# Patient Record
Sex: FEMALE | Race: WHITE
[De-identification: names, ages, dates, MRNs, and addresses within clinical notes are randomized per-mention and may not be internally consistent; named-entity substitution may affect disease eponyms.]

---

## 2018-01-04 ENCOUNTER — HOSPITAL ENCOUNTER (INPATIENT)
Dept: HOSPITAL 50 - VM.MS | Age: 83
LOS: 4 days | Discharge: NURSING, REHAB OR LONG-TERM CARE | DRG: 193 | End: 2018-01-08
Attending: FAMILY MEDICINE | Admitting: FAMILY MEDICINE
Payer: MEDICARE

## 2018-01-04 DIAGNOSIS — Z79.82: ICD-10-CM

## 2018-01-04 DIAGNOSIS — R79.1: ICD-10-CM

## 2018-01-04 DIAGNOSIS — G31.84: ICD-10-CM

## 2018-01-04 DIAGNOSIS — Z88.5: ICD-10-CM

## 2018-01-04 DIAGNOSIS — J11.00: Primary | ICD-10-CM

## 2018-01-04 DIAGNOSIS — Z85.3: ICD-10-CM

## 2018-01-04 DIAGNOSIS — I11.0: ICD-10-CM

## 2018-01-04 DIAGNOSIS — M19.90: ICD-10-CM

## 2018-01-04 DIAGNOSIS — I48.91: ICD-10-CM

## 2018-01-04 DIAGNOSIS — K21.9: ICD-10-CM

## 2018-01-04 DIAGNOSIS — Z86.73: ICD-10-CM

## 2018-01-04 DIAGNOSIS — I50.32: ICD-10-CM

## 2018-01-04 DIAGNOSIS — Z91.013: ICD-10-CM

## 2018-01-04 DIAGNOSIS — Z79.899: ICD-10-CM

## 2018-01-04 DIAGNOSIS — Z79.01: ICD-10-CM

## 2018-01-04 DIAGNOSIS — J96.01: ICD-10-CM

## 2018-01-04 DIAGNOSIS — F32.9: ICD-10-CM

## 2018-01-04 DIAGNOSIS — F03.90: ICD-10-CM

## 2018-01-04 DIAGNOSIS — E78.00: ICD-10-CM

## 2018-01-04 DIAGNOSIS — Z88.1: ICD-10-CM

## 2018-01-04 DIAGNOSIS — M81.0: ICD-10-CM

## 2018-01-04 NOTE — PCM.HP
H&P History of Present Illness





- General


Date of Service: 01/04/18


Admit Problem/Dx: 


 Admission Diagnosis/Problem





Admission Diagnosis/Problem      Pneumonia








Source of Information: Patient, Nursing Home Records





- History of Present Illness


Initial Comments - Free Text/Narative: 





HPI Comments:~FEver, dyspnea, cough - past 24 hours at nursing home. ~No chest 

pain. ~Probably exposed to some flu. ~Receiv flu shot. ~Receive Tamiflu 

prophylaxis 1 or two doses.





Fever~~


Associated symptoms include coughing. Pertinent negatives include no chest pain~

or vomiting. 


Other~


Associated symptoms include coughing~and a fever. Pertinent negatives include 

no chest pain~or vomiting. 


Pallor~


Associated symptoms include coughing~and a fever. Pertinent negatives include 

no chest pain~or vomiting. 








Medications











  MedicationsPriortoVisit


 




















  Outpatient Medications Prior to Visit





Medication


Sig


Dispense


Refill








traMADol (ULTRAM) 50 mg tablet


Take 1 tablet (50 mg) by mouth 2 times a day TAKE 1 TABLET BY MOUTH 3 TIMES 

DAILY


60 tablet


5








furosemide (LASIX) 20 mg tablet


Take 2 tablets (40 mg) by mouth 2 times a day


120 tablet


11








ketoconazole (NIZORAL) 2% shampoo


APPLY TO DAMP SCALP, LEAVE ON AS LONG AS POSSIBLE BEFORE SHAMPOOING OUT, USE 

DAILY


120 mL


11








mirtazapine (REMERON) 15 mg tablet


Take 15 mg by mouth 1 time per day














omega-3 fatty acids (FISH OIL) 1000 mg capsule


Take 1,000 mg by mouth 1 time per day














Sennosides-Docusate Sodium (SENNA PLUS PO)


Take by mouth 2 times a day














bisacodyl (DULCOLAX) 10 mg suppository


10 mg














calcium ascorbate 500 MG TABS


500 mg














predniSONE 5 mg tablet


TAKE 1 TABLET BY MOUTH ONE TIME A DAY WITH 1MG TABLET


14 tablet


11








warfarin (COUMADIN) 5 mg tablet


[facility] Anticoagulation Pt:Take as directed. (Insurance Purposes: 2.5-5 

mg daily dose range) Call [phone] if questions.


90 tablet


3








potassium chloride (K-PEEWEE) 20 MEQ packet


Take 1 packet (20 mEq total) by mouth 3 times a day Dissolve in 4 to 8 ounces 

of water or juice. (Patient taking differently: Take 20 mEq by mouth 1 time per 

day Dissolve in 4 to 8 ounces of water or juice. )


90 packet


3








Phytonadione, vitamin K, 100 MCG TABS tablet


Take 1 tablet (100 micrograms) by mouth once daily while on warfarin to 

stabilize labile INRs and dietary vitamin K consumption.


100 tablet


3








aspirin 81 mg chewable tablet


Take 1 tablet (81 mg total) by mouth 1 time per day


30 tablet


1








atorvaSTATin (LIPITOR) 40 mg tablet


Take 1 tablet (40 mg total) by mouth 1 time per day


30 tablet


1








acetaminophen (TYLENOL) 500 mg tablet


Take 1 tablet (500 mg total) by mouth Every 4 hours as needed for mild pain or 

fever > (indicate temp) (Temp >100.0)


30 tablet


1








albuterol HFA (PROAIR HFA) 108 (90 BASE) MCG/ACT inhaler


Inhale 1 puff orally every 4 to 6 hours as needed for shortness of breath Shake 

well before using.


1 Inhaler


0








venlafaxine (EFFEXOR) 75 mg tablet


Take 1 tablet (75 mg total) by mouth 2 times a day


30 tablet


1








ondansetron (ZOFRAN) 4 mg tablet


Take 1 tablet (4 mg total) by mouth every 6 hours as needed for nausea (if 

nausea does not improve in one hour may take a second tablet)


60 tablet


1








carVEDilol (COREG) 6.25 mg tablet


Take 1 tablet (6.25 mg total) by mouth 2 times a day with meals


60 tablet


1








fluocinonide (LIDEX) 0.05 % SOLN


Apply once or twice daily to affected scalp as needed for itching and rash


60 mL


11








pyrithione zinc (SELSUN BLUE DRY SCALP) 1 % SHAM


Shampoo hair with Selsun Blue 2 times a week for 2 week, then once weekly 

thereafter.


118 mL


0








melatonin 3 mg tablet


Take 1 tablet (3 mg total) by mouth every night at bedtime


30 tablet


1








multivitamin/lutein (PROSIGHT;OCUVITE-LUTEIN) capsule


Take 1 capsule by mouth 1 time per day


30 capsule


1








brimonidine 0.2 % SOLN


Place 1 drop into both eyes 2 times a day


10 mL


11








polyethyl glycol-propyl glycol (SYSTANE) 0.4-0.3 % SOLN


Place 1 drop into both eyes Every 4 hours as needed for dry eyes


10 mL


0








omeprazole (PRILOSEC) 20 mg capsule


Take 1 capsule (20 mg total) by mouth 1 time a day in the morning


30 capsule


1








vitamin C, ascorbic acid, 500 MG tablet


Take 1 tablet (500 mg total) by mouth 2 times a day


60 tablet


1








predniSONE 20 mg tablet


2 today then 1 Once Daily x 7d


9 tablet


0








No facility-administered medications prior to visit. 





 








Allergies








  


 


Allergies  


 


Allergen Reactions 


 


 Hayfever [Hay Fever] Wheezing/Bronchospasm (High)


 


 Ceftin [Cefuroxime] Unknown/Not Verified


 


 Ciprofloxacin Unknown/Not Verified


 


 Codeine Unknown/Not Verified


 


 Shellfish-Derived Products Unknown/Not Verified








Problem List








  


 


Patient Active Problem List  


 


 Diagnosis 


 


 Pneumonia of right middle lobe due to infectious organism 


 


  admit 11/16 has aspiration on swallow eval


 


 Decubitus ulcer of coccyx 


 


 Inflammatory arthritis 


 


  spring of 16 after CVA


 


 E. coli UTI 


 


  After stroke 4/16 tx with Macrobid, e.coli again in 6/16 resistant to 

bactrim later got cipro


 


 Long-term (current) use of anticoagulants 


 


 Acute right arterial ischemic stroke, MCA (middle cerebral artery) 


 


  S/p thrombectomy 4/12/16 with significant residual left sided paralysis 

especially in her arm


 


 Seborrheic dermatitis of scalp 


 


  Derm 9/14 had milia then also on Lidex 


 


 Glaucoma 


 


 Pseudophakos 


 


 Presbyopia 


 


 Bradycardia 


 


  On carvedelol with amitriptylene., 


 


 Hypertension, essential, benign 


 


 Hypercholesterolemia 


 


 Chronic atrial fibrillation 


 


  Onset about 2010 on coumadin until 2013, when having ischemic colitis and 

some cognitive decline. No cardioversion. ~Remote hx of TIA, CVA in 4/16 

restarted coumadin 


 


 Cognitive dysfunction 


 


  4/28/14 MMSE 26/30. Has depression also. ~She was 21/30 when admitted for a 

concussion her CPT was 3.1


 


 Depression 


 


  Was on amitriptylene in [place], In 2014 taper off in  and start zoloft. 

Also has dementia. ~Trial of effexor in 9/14 and plan to taper down on Zoloft 

also.. ~Effexor increased in 2/16 to 75 mg twice daily


 


 Osteoporosis, senile 


 


  Was on fosamax , temporarily stopped in 2013 and later held in 9/14 due to 

muscle aches





 


 Ischemic colitis 


 


  Colonoscopy Nov 2012 in [place].


 


 Breast cancer 


 


  Surgery in 1999 Right. And had elective mastectomy on other side.


 


 CHF (congestive heart failure) 


 


  Feb 2013, ischemic cardiomyopathy ~25%, on coreg. 


Apr 2016 echo shows 55% EF, mild aortic regurg, moderate mitral valve regurg.


Admit 11/16 with aspiration pneumonia also 


 


 GERD (gastroesophageal reflux disease) 


 


  EGD Nov 2012.Prilosec


 


 Degenerative joint disease of knee 


 


 Diverticulosis 


 


 TIA (transient ischemic attack) 


 


 Colon polyp 


 


  Colon polyps in past, [place] , 2002,2003,2007,,5/2/2012








Medical/Surgical/Family/Social











  PastMedicalHistory


 














  Past Medical History:





Diagnosis


Date








Atrial fibrillation











Onset about 2010 on coumadin until 2013, when having ischemic colitis and some 

cognitive decline. No cardioversion. 








Bradycardia


4/28/2014








On carvedelol with amitriptylene., ~








Breast cancer











Surgery in 1999 Right. And had elective mastectomy on other side. 








CHF (congestive heart failure)











Feb 2013, ischemic cardiomyopathy ~25%, on coreg 








Cognitive dysfunction











Cognitive dysfunction











4/28/14 MMSE 26/30. Has depression also. 








Colon polyp


5/2/2012








Colon polyps in past, [place] , 2002,2003,2007,,5/2/2012 








Degenerative joint disease of knee











Depression











Was on amitriptylene in [place], In 2014 taper off in  and start zoloft. 

Also had dementia. 








Diverticulosis











GERD (gastroesophageal reflux disease)











EGD Nov 2012.Prilosec 








Hypercholesterolemia











Hypertension, essential, benign











Ischemic colitis











Colonoscopy Nov 2012 in [place]. 








Osteoporosis, senile











Was on fosamax , temporarily stopped in 2013. ~








Psychotherapy and other treatment for mental disorder follow-up examination











Skin lesion of scalp


4/28/2014








TIA (transient ischemic attack)








 














 PastSurgicalHistory


 

















  Past Surgical History:





Procedure


Laterality


Date








APPENDECTOMY














BIOPSY LUNG














BREAST SURGERY


Bilateral


1999








one side cancer, other side prophylactic








CATARACT EXTRACTION


Bilateral











TONSILLECTOMY & ADENOIDECTOMY < AGE 12











 














 FamilyHistory


 




















  Family History





Problem


Relation


Age of Onset








Heart


Mother











Cataracts


Mother











Heart Disease


Mother











Cancer (not otherwise listed)


Father














leukemia








Heart


Brother











Heart Disease


Brother











Heart


Brother














MI








Heart Disease


Brother











Cancer (not otherwise listed)


Brother











Negative


Brother











Blindness


Neg Hx











Glaucoma


Neg Hx











Macular Degeneration


Neg Hx











Retinal Detachment


Neg Hx








 














 SocialHistory


 


 Social History























Social History








Marital status:














Spouse name:


N/A








Number of children:


N/A








Years of education:


N/A




















Occupational History








retired























Social History Main Topics








Smoking status:


Former Smoker








Smokeless tobacco:


Never Used








Alcohol use


No








Drug use:


No








Sexual activity:


Not on file




















Other Topics


Concern








Not on file

















Social History Narrative








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 

Pt was  and at library booking..Grew up in [place], Moved into 

assisted living in [place] in 2011, relocated to be in assisted living in 

[place] in April 2014. Son is retired ENT physician from [place], move to 

[place] in 2014. Daughter, Oanh, in primary caregiver. 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 Tob 

rare, stopped at 41 y/o. EtOH occasionally.
































 








ROS


Review of Systems 


Constitutional: Positive for fever. 


Respiratory: Positive for cough~and shortness of breath. ~


Cardiovascular: Negative for chest pain. 


Gastrointestinal: Negative for vomiting. 











Physical / Results 





/62 ~Pulse 82 ~Temp 99.5 F (37.5 C) (Tympanic) ~SpO2 87%|| 


Physical Exam~


Constitutional: She appears well-developed~and well-nourished. No distress. 


HENT: 


Mouth/Throat: Oropharynx is clear and moist. 


Cardiovascular: Normal rate, regular rhythm~and normal heart sounds. ~


Pulmonary/Chest: Effort normal. She has wheezes. She has rales. 


Some mild B wheeze, rhonchi, rale~


Abdominal: Soft. 


Musculoskeletal: She exhibits edema. She exhibits no tenderness. 


Skin: She is not diaphoretic. 











Assessment / Plan











 


 


Problem List Items Addressed This Visit~ 


 


 None


 


 


 


 


 


 


 


Visit Diagnoses~ 


 


 SOB (shortness of breath)~~~~- ~Primary


 


 Relevant Medications


 


 albuterol-ipratropium (DUO-NEB) 2.5-0.5 mg/3 mL inhalation solution 3 mL


 


 Other Relevant Orders


 


 COMPLETE BLOOD COUNT WITH DIFFERENTIAL


 


 COMPREHENSIVE METABOLIC PANEL


 


 C-REACTIVE PROTEIN (INFLAMMATION)


 


 INFLUENZA A AND B NUCLEIC ACID DETECTION


 


 XRAY CHEST PA AND LATERAL


 


 








Plan: Fever, cough, hypoxia


CXR looks mostly unchanged, hard to read expiratory film in wheelchair pt, PNA 

would still be most likely dx


Admit for O2, nebs, rocephin + azith, double prednisone x at least 3d


Will do tamiflu more in the prophylactic qd~dose, I doubt one or two doses 

would make her PCR falsely neg today





Appears euvolemic


Hold on IVF for now given CHF hx





Already on warfarn for afib which will cover DVT prophylaxis


INR in AM





Last ESR 82


Localized arthritis often PMR equivalent at this age





DNR per NH chart


























Outpatient Medications Prior to Visit    


 


Medication Sig Dispense Refill 


 


 traMADol (ULTRAM) 50 mg tablet Take 1 tablet (50 mg) by mouth 2 times a day 

TAKE 1 TABLET BY MOUTH 3 TIMES DAILY 60 tablet 5


 


 furosemide (LASIX) 20 mg tablet Take 2 tablets (40 mg) by mouth 2 times a day 

120 tablet 11


 


 ketocnazole (NIZORAL) 2% shampoo APPLY TO DAMP SCALP, LEAVE ON AS LONG AS 

POSSIBLE BEFORE SHAMPOOING OUT, USE DAILY 120 mL 11


 


 mirtazapine (REMERON) 15 mg tablet Take 15 mg by mouth 1 time per day  


 


 omega-3 fatty acids (FISH OIL) 1000 mg capsule Take 1,000 mg by mouth 1 time 

per day  


 


 Sennosides-Docusate Sodium (SENNA PLUS PO) Take by mouth 2 times a day  


 


 bisacodyl (DULCOLAX) 10 mg suppository 10 mg  


 


 calcium ascorbate 500 MG TABS 500 mg  


 


 predniSONE 5 mg tablet TAKE 1 TABLET BY MOUTH ONE TIME A DAY WITH 1MG TABLET 

14 tablet 11


 


 warfarin (COUMADIN) 5 mg tablet [facility] Anticoagulation Pt:Take as 

directed. (Insurance Purposes: 2.5-5 mg daily dose range) Call [phone] if 

questions. 90 tablet 3


 


 potassium chloride (K-PEEWEE) 20 MEQ packet Take 1 packet (20 mEq total) by 

mouth 3 times a day Dissolve in 4 to 8 ounces of water or juice. (Patient 

taking differently: Take 20 mEq by mouth 1 time per day Dissolve in 4 to 8 

ounces of water or juice. ) 90 packet 3


 


 Phytonadione, vitamin K, 100 MCG TABS tablet Take 1 tablet (100 micrograms) 

by mouth once daily while on warfarin to stabilize labile INRs and dietary 

vitamin K consumption. 100 tablet 3


 


 aspirin 81 mg chewable tablet Take 1 tablet (81 mg total) by mouth 1 time per 

day 30 tablet 1


 


 atorvaSTATin (LIPITOR) 40 mg tablet Take 1 tablet (40 mg total) by mouth 1 

time per day 30 tablet 1


 


 acetaminophen (TYLENOL) 500 mg tablet Take 1 tablet (500 mg total) by mouth 

Every 4 hours as needed for mild pain or fever > (indicate temp) (Temp >100.0) 

30 tablet 1


 


 albuterol HFA (PROAIR HFA) 108 (90 BASE) MCG/ACT inhaler Inhale 1 puff orally 

every 4 to 6 hours as needed for shortness of breath Shake well before using. 1 

Inhaler 0


 


 venlafaxine (EFFEXOR) 75 mg tablet Take 1 tablet (75 mg total) by mouth 2 

times a day 30 tablet 1


 


 ondansetron (ZOFRAN) 4 mg tablet Take 1 tablet (4 mg total) by mouth every 6 

hours as needed for nausea (if nausea does not improve in one hour may take a 

second tablet) 60 tablet 1


 


 carVEDilol (COREG) 6.25 mg tablet Take 1 tablet (6.25 mg total) by mouth 2 

times a day with meals 60 tablet 1


 


 fluocinonide (LIDEX) 0.05 % SOLN Apply once or twice daily to affected scalp 

as needed for itching and rash 60 mL 11


 


 pyrithione zinc (SELSUN BLUE DRY SCALP) 1 % SHAM Shampoo hair with Selsun 

Blue 2 times a week for 2 week, then once weekly thereafter. 118 mL 0


 


 melatonin 3 mg tablet Take 1 tablet (3 mg total) by mouth every night at 

bedtime 30 tablet 1


 


 multivitamin/lutein (PROSIGHT;OCUVITE-LUTEIN) capsule Take 1 capsule by mouth 

1 time per day 30 capsule 1


 


 brimonidine 0.2 % SOLN Place 1 drop into both eyes 2 times a day 10 mL 11


 


 polyethyl glycol-propyl glycol (SYSTANE) 0.4-0.3 % SOLN Place 1 drop into 

both eyes Every 4 hours as needed for dry eyes 10 mL 0


 


 omeprazole (PRILOSEC) 20 mg capsule Take 1 capsule (20 mg total) by mouth 1 

time a day in the morning 30 capsule 1


 


 vitamin C, ascorbic acid, 500 MG tablet Take 1 tablet (500 mg total) by mouth 

2 times a day 60 tablet 1


 


 predniSONE 20 mg tablet 2 today then 1 Once Daily x 7d 9 tablet 0














No facility-administered medications prior to visit. 























- Related Data


Allergies/Adverse Reactions: 


 Allergies











Allergy/AdvReac Type Severity Reaction Status Date / Time


 


cefuroxime axetil Allergy  Cannot Verified 11/28/16 09:15





[From Ceftin]   Remember  


 


ciprofloxacin [From Cipro] Allergy  Cannot Verified 11/28/16 09:15





   Remember  


 


codeine Allergy  Cannot Verified 11/28/16 09:15





   Remember  


 


shellfish derived Allergy  Cannot Verified 11/28/16 09:15





   Remember  


 


hayfever Allergy  Cannot Uncoded 11/27/16 00:43





   Remember  


 


shell Allergy  Cannot Uncoded 11/27/16 00:43





   Remember  











Home Medications: 


 Home Meds





Albuterol [Proair HFA] 1 puff IH Q6H PRN 05/05/15 [History]


Carvedilol [Coreg] 6.25 mg PO BID 05/05/15 [History]


Omeprazole 20 mg PO DAILY 05/05/15 [History]


Aspirin 81 mg PO DAILY 04/19/16 [History]


Fluocinonide 1 applic TOP BID PRN 04/19/16 [History]


Ketoconazole [Nizoral 2% Shampoo] 1 applic TOP ASDIRECTED 04/19/16 [History]


Melatonin 3 mg PO BEDTIME 04/19/16 [History]


Triamcinolone Acetonide [Triamcinolone Acetonide 0.1% Crm] 1 applic TOP BID 04/ 19/16 [History]


atorvaSTATin [Lipitor] 40 mg PO BEDTIME 04/19/16 [History]


Venlafaxine [Effexor] 75 mg PO BID 04/20/16 [History]


Docusate Sodium/Sennosides [Senna Plus] 1 tab PO BID #60 tablet 05/05/16 [Rx]


Phytonadione [Vitamin K] 100 mcg PO DAILY 11/27/16 [History]


Potassium Chloride [Klor-Con M20] 20 meq PO DAILY 11/27/16 [History]


predniSONE [Prednisone] 5 mg PO DAILY 11/27/16 [History]


traMADol [Ultram] 50 mg PO TID 11/27/16 [History]


Acetaminophen [Tylenol] 650 mg PO Q4H PRN 11/28/16 [History]


Bisacodyl [Dulcolax] 5 mg PO Q3D PRN 11/28/16 [History]


Brimonidine Tartrate [Brimonidine Tartrate 0.2% Ophth Soln] 1 drop EYEBOTH BID 

11/28/16 [History]


Fish Oil/Omega-3 Fatty Acids [Fish Oil 1,000 MG] 1 each PO DAILY 11/28/16 [

History]


Methyl Salicylate/Menthol [Icy Hot] 1 applic TOP BID 11/28/16 [History]


Mirtazapine [Remeron] 15 mg PO BEDTIME 11/28/16 [History]


Multivitamin [Multivitamins] 1 each PO DAILY 11/28/16 [History]


Ondansetron [Zofran ODT] 4 mg PO Q6H PRN 11/28/16 [History]


Warfarin [Coumadin] 5 mg PO MO@2000 11/28/16 [History]


Warfarin [Coumadin] 7.5 mg PO SUTUWETHFRSA@2000 11/28/16 [History]


Doxycycline Calcium [IMW: Doxycycline] 100 mg PO BID #11 capsule 12/02/16 [Rx]


Ascorbate Calcium [Calcium Ascorbate]  01/04/18 [History]


Ascorbate Calcium [Vitamin C]  01/04/18 [History]


Bisacodyl 10 mg RC PRN 01/04/18 [History]


Fluocinonide [Lidex 0.05% Crm] 15 gm TOP BID PRN 01/04/18 [History]


Furosemide 2 tab PO BID 01/04/18 [History]


Omeprazole 1 cap PO DAILY 01/04/18 [History]


Polyethylene Glycol/Polyvinyl [Hypotears Eye Drops] 1 drop EYEBOTH Q4H PRN 01/04 /18 [History]











Past Medical History


HEENT History: Reports: Cataract


Cardiovascular History: Reports: Afib, Heart Failure, High Cholesterol, 

Hypertension


Gastrointestinal History: Reports: Colon Polyp, Diverticulosis


OB/GYN History: Reports: Pregnancy


Musculoskeletal History: Reports: Arthritis


Neurological History: Reports: CVA, TIA


Psychiatric History: Reports: Dementia


Oncologic (Cancer) History: Reports: Breast


Dermatologic History: Reports: Psoriasis





- Past Surgical History


HEENT Surgical History: Reports: Adenoidectomy, Cataract Surgery, Tonsillectomy


Female  Surgical History: Reports: Breast Biopsy, Hysterectomy


Neurological Surgical History: Reports: Other (See Below)





Social & Family History





- Tobacco Use


Smoking Status *Q: Never Smoker





- Alcohol Use


Days Per Week of Alcohol Use: 0





- Recreational Drug Use


Recreational Drug Use: No





H&P Review of Systems





- Review of Systems:


Review Of Systems: See Below





Exam





- Exam


Exam: See Below





- Vital Signs


Weight: 76.839 kg





*Q Meaningful Use (ADM)





- VTE *Q


VTE Criteria *Q: 








- Stroke *Q


Stroke Criteria *Q: 








- AMI *Q


AMI Criteria *Q: 





Problem List Initiated/Reviewed/Updated: Yes


Orders Last 24hrs: 


 Active Orders 24 hr











 Category Date Time Status


 


 Admission Status [Patient Status] [ADT] Routine ADT  01/04/18 13:38 Active


 


 Patient Status [ADT] Routine ADT  01/04/18 14:50 Ordered


 


 Oxygen Therapy [RC] PRN Care  01/04/18 14:50 Ordered


 


 RT Aerosol Therapy [RC] ASDIRECTED Care  01/04/18 14:53 Ordered


 


 Up With Assistance [RC] ASDIRECTED Care  01/04/18 14:50 Ordered


 


 VTE/DVT Education [RC] PER UNIT ROUTINE Care  01/04/18 14:50 Ordered


 


 Vital Signs [RC] Q4H Care  01/04/18 14:50 Ordered


 


 Regular Diet [DIET] Diet  01/04/18 Dinner Ordered


 


 BASIC METABOLIC PANEL,BMP [CHEM] AM Lab  01/05/18 05:11 Ordered


 


 CBC WITH AUTO DIFF [HEME] AM Lab  01/05/18 05:11 Ordered


 


 CRP [C-REACTIVE PROTEIN] [CHEM] AM Lab  01/05/18 05:11 Ordered


 


 CULTURE MRSA SURVEY [RM] Routine Lab  01/04/18 13:54 Received


 


 INR,PT,PROTHROMBIN TIME [COAG] AM Lab  01/05/18 05:11 Ordered


 


 Acetaminophen [Tylenol] Med  01/04/18 14:50 Ordered





 650 mg PO Q4H PRN   


 


 Acetaminophen [Tylenol] Med  01/04/18 15:02 Ordered





 650 mg PO Q4H PRN   


 


 Albuterol [Take Home: Albuterol 6.7 GM, 1 INH Pack] Med  01/04/18 15:02 Ordered





 1 puff INH Q6H PRN   


 


 Albuterol/Ipratropium [DuoNeb 3.0-0.5 MG/3 ML] Med  01/04/18 14:50 Ordered





 3 ml NEB Q4H PRN   


 


 Ascorbate Calcium [Calcium Ascorbate] Med  01/04/18 15:15 Unverified





 DOSE UNIT RTE FREQ   


 


 Ascorbate Calcium [Vitamin C] Med  01/04/18 15:15 Unverified





 DOSE UNIT RTE FREQ   


 


 Aspirin Med  01/05/18 08:00 Ordered





 81 mg PO DAILY   


 


 Azithromycin [Zithromax] 500 mg Med  01/04/18 15:15 Ordered





 Sodium Chloride 0.9% [Normal Saline] 250 ml   





 IV DAILY   


 


 Bisacodyl [Dulcolax] Med  01/04/18 15:02 Ordered





 5 mg PO Q3D PRN   


 


 Bisacodyl [Dulcolax] Med  01/04/18 15:02 Unverified





 DOSE UNIT RTE FREQ PRN   


 


 Brimonidine [Alphagan 0.2% Ophth Soln] Med  01/04/18 20:00 Ordered





 1 drop EYEBOTH BID   


 


 Carvedilol [Coreg] Med  01/04/18 20:00 Ordered





 6.25 mg PO BID   


 


 Docusate Sodium/Sennosides [Senna Plus] Med  01/04/18 20:00 Ordered





 1 tab PO BID   


 


 Fish Oil/Omega-3 Fatty Acids [Fish Oil] Med  01/05/18 08:00 Ordered





 1 each PO DAILY   


 


 Fluocinonide [Fluocinonide] Med  01/04/18 15:02 Ordered





 1 applic TOP BID PRN   


 


 Fluocinonide [Lidex 0.05% Crm] Med  01/04/18 15:02 Ordered





 15 gm TOP BID PRN   


 


 Furosemide [Lasix] Med  01/04/18 20:00 Ordered





 2 tab PO BID   


 


 HYDROmorphone [Dilaudid] Med  01/04/18 14:50 Ordered





 0.25 mg IVPUSH Q2H PRN   


 


 Ketoconazole [Nizoral 2% Shampoo] Med  01/04/18 15:15 Ordered





 1 applic TOP ASDIRECTED   


 


 Melatonin Med  01/04/18 20:00 Ordered





 3 mg PO BEDTIME   


 


 Menthol/Methyl Salicylate [Icy Hot Cream] Med  01/04/18 20:00 Ordered





 1 applic TOP BID   


 


 Mirtazapine [Remeron] Med  01/04/18 20:00 Ordered





 15 mg PO BEDTIME   


 


 Multivitamin [Multivitamins] Med  01/05/18 08:00 Ordered





 1 each PO DAILY   


 


 Omeprazole Med  01/05/18 08:00 Ordered





 1 cap PO DAILY   


 


 Omeprazole Med  01/05/18 08:00 Ordered





 20 mg PO DAILY   


 


 Ondansetron [Zofran ODT] Med  01/04/18 15:02 Ordered





 4 mg PO Q6H PRN   


 


 Ondansetron [Zofran] Med  01/04/18 14:50 Ordered





 4 mg IV Q6H PRN   


 


 Phytonadione [Vitamin K] Med  01/05/18 08:00 Ordered





 100 mcg PO DAILY   


 


 Polyethylene Glycol/Polyvinyl [Hypotears Eye Drops] Med  01/04/18 15:02 Ordered





 1 drop EYEBOTH Q4H PRN   


 


 Potassium Chloride [Klor-Con M20] Med  01/05/18 08:00 Ordered





 20 meq PO DAILY   


 


 Sodium Chloride 0.9% [Saline Flush] Med  01/04/18 14:50 Ordered





 10 ml FLUSH ASDIRECTED PRN   


 


 Triamcinolone Acetonide [Triamcinolone Acetonide 0.1% Med  01/04/18 20:00 

Ordered





 Crm]   





 1 applic TOP BID   


 


 Venlafaxine [Effexor] Med  01/04/18 20:00 Ordered





 75 mg PO BID   


 


 Warfarin Med  01/04/18 20:00 Ordered





 7.5 mg PO SUTUWETHFRSA@2000   


 


 Warfarin [Coumadin] Med  01/08/18 20:00 Ordered





 5 mg PO MO@2000   


 


 atorvaSTATin [Lipitor] Med  01/04/18 20:00 Ordered





 40 mg PO BEDTIME   


 


 cefTRIAXone [Rocephin] Med  01/04/18 15:15 Ordered





 1 gm IVPUSH DAILY   


 


 traMADol [Ultram] Med  01/04/18 20:00 Ordered





 50 mg PO TID   


 


 Peripheral IV Insertion Adult [OM.PC] Routine Oth  01/04/18 14:50 Ordered


 


 Resuscitation Status Routine Resus Stat  01/04/18 14:50 Ordered








 Medication Orders





Acetaminophen (Tylenol)  650 mg PO Q4H PRN


   PRN Reason: Pain (Mild 1-3)/fever


Acetaminophen (Tylenol)  650 mg PO Q4H PRN


   PRN Reason: Pain


Albuterol (Take Home: Albuterol 6.7 Gm, 1 Inh Pack)   packet INH Q6H PRN


   PRN Reason: Shortness of Breath


Albuterol/Ipratropium (Duoneb 3.0-0.5 Mg/3 Ml)  3 ml NEB Q4H PRN


   PRN Reason: dyspnea/wheezing


Aspirin (Aspirin)  81 mg PO DAILY ANUJ


Atorvastatin Calcium (Lipitor)  40 mg PO BEDTIME ANUJ


Bisacodyl (Dulcolax)  5 mg PO Q3D PRN


   PRN Reason: Constipation


Brimonidine Tartrate (Alphagan 0.2% Ophth Soln)   ml EYEBOTH BID ANUJ


Carvedilol (Coreg)  6.25 mg PO BID ANUJ


Fish Oil (Fish Oil)   gm PO DAILY ANUJ


Furosemide (Lasix)   mg PO BID ANUJ


Hydromorphone HCl (Dilaudid)  0.25 mg IVPUSH Q2H PRN


   PRN Reason: Pain (severe 7-10)


Melatonin (Melatonin)  3 mg PO BEDTIME ANUJ


Methyl Salicylate (Icy Hot Cream)   gm TOP BID ANUJ


Mirtazapine (Remeron)  15 mg PO BEDTIME ANUJ


Non-Formulary Medication (Fluocinonide [Fluocinonide])  1 applic TOP BID PRN


   PRN Reason: itching or rash to scalp


Non-Formulary Medication (Fluocinonide [Lidex 0.05% Crm])  15 gm TOP BID PRN


   PRN Reason: Itching


Non-Formulary Medication (Ketoconazole [Nizoral 2% Shampoo])  1 applic TOP 

ASDIRECTED [facility]


Non-Formulary Medication (Multivitamin [Multivitamins])  1 each PO DAILY [facility]


Non-Formulary Medication (Polyethylene Glycol/Polyvinyl [Hypotears Eye Drops])  

1 drop EYEBOTH Q4H PRN


   PRN Reason: Dry Eyes


Non-Formulary Medication (Venlafaxine [Effexor])  75 mg PO BID [facility]


Non-Formulary Medication (Warfarin)  7.5 mg PO SUTUWETHFRSA@2000 [facility]


Omeprazole (Omeprazole)   mg PO DAILY [facility]


Omeprazole (Omeprazole)  20 mg PO DAILY [facility]


Ondansetron HCl (Zofran)  4 mg IV Q6H PRN


   PRN Reason: Nausea/Vomiting


Ondansetron HCl (Zofran Odt)  4 mg PO Q6H PRN


   PRN Reason: Nausea


Phytonadione (Vitamin K)  100 mcg PO DAILY [facility]


Potassium Chloride (Klor-Con M20)  20 meq PO DAILY [facility]


Senna/Docusate Sodium (Senna Plus)  1 tab PO BID [facility]


Sodium Chloride (Saline Flush)  10 ml FLUSH ASDIRECTED PRN


   PRN Reason: Keep Vein Open


Tramadol HCl (Ultram)  50 mg PO TID [facility]


Triamcinolone Acetonide (Triamcinolone Acetonide 0.1% Crm)   gm TOP BID [facility]


Warfarin Sodium (Coumadin)  5 mg PO MO@2000 [facility]








Assessment/Plan Comment:: 








Fever, cough, hypoxia


CXR looks mostly unchanged, hard to read expiratory film in wheelchair pt, PNA 

would still be most likely dx


Admit for O2, nebs, rocephin + azith, double prednisone x at least 3d


Will do tamiflu more in the prophylactic qd~dose, I doubt one or two doses 

would make her PCR falsely neg today





Appears euvolemic


Hold on IVF for now given CHF hx





Already on warfarn for afib which will cover DVT prophylaxis


INR in AM





Last ESR 82


Localized arthritis often PMR equivalent at this age





DNR per NH chart

## 2018-01-05 LAB
CHLORIDE SERPL-SCNC: 103 MMOL/L (ref 98–107)
SODIUM SERPL-SCNC: 143 MMOL/L (ref 136–145)

## 2018-01-05 RX ADMIN — OMEGA-3 FATTY ACIDS CAP 1000 MG SCH GM: 1000 CAP at 09:30

## 2018-01-05 RX ADMIN — OMEPRAZOLE SCH: 20 CAPSULE, DELAYED RELEASE ORAL at 06:04

## 2018-01-05 RX ADMIN — Medication SCH TAB: at 09:30

## 2018-01-05 RX ADMIN — OMEPRAZOLE SCH MG: 20 CAPSULE, DELAYED RELEASE ORAL at 05:24

## 2018-01-05 NOTE — PN
Progress Note for SETH GOMEZ  Date:  01/05/2018  Room #:  VM.203

 

SUBJECTIVE:  Hospital day #2 on an 89-year-old admitted with cough, weakness,

shortness of breath, and fever.  Suspected to have influenza, but did test

negative.  Chest x-ray from the clinic was suggestive of a mild infiltrate over

the right mid lung.  She denies having any trouble with coughing while eating,

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  She otherwise has been

afebrile since her admission to [facility], did have a temperature of 99.5

in the clinic.  Saturations were low at 88% on admission, but she has been

saturating above 90% now on 2.5 L.  She is denying any pain anywhere.  She does

have her left knee wrapped up, but she denies that it is sore.  She has had

inflammatory arthritis and has been on prednisone 5 mg daily long-term.  She did

not eat any breakfast yet this morning.

 

OBJECTIVE:  Vital Signs:  Her temperature is 97.6, pulse 85, blood pressure

128/62, respiratory rate 20, and O2 of 93 on 2.5 L.

General:  She is in no acute distress.

Heart:  Irregularly irregular.

Respiratory:  Lung sounds show some decreased air entry with rhonchi over the

right mid lung.

Abdomen:  Positive bowel sounds.  It is soft and nontender.

Extremities:  Warm and dry.  No edema.

Mental Status:  She is alert.  She is not sure of her location.  She thinks she

is in [place].  She tells me she does recognize me, but she does not use my

name.

 

LABORATORY DATA:  Lab work today did show her white count to be improved to

10.4, hemoglobin 10.6, and platelets 202.  INR up to 5.9.  Sodium 143, potassium

3.4, chloride 103, bicarbonate 32, BUN 15, creatinine 0.7, and calcium 8.6.  CRP

is 17.3; yesterday, through the clinic, it was 109.4.  White count was 11.5

yesterday.

 

ASSESSMENT:

1. Fever, dyspnea, and cough for now 48 hours, seems to be improving.  She

    will continue with Tamiflu.

2. Probable pneumonia based on significantly elevated CRP, white count, and

    symptoms.  Could all be related to influenza, but given her negative flu

    test, I am going to continue her on the antibiotics of Zithromax and

    Rocephin.  Even though cephalosporins have been listed as an allergy, she

    seems to be tolerating it.  No indication to escalate antibiotics.  No

    methicillin-resistant Staphylococcus aureus was isolated on her culture due

    to her nursing home status.

3. Acute hypoxic respiratory failure secondary to pneumonia.  We will continue

    to wean oxygen.

4. History of stroke with underlying atrial fibrillation.

5. Supratherapeutic INR.  I am going to hold Coumadin.  We will repeat her INR

    tomorrow.

6. Chronic congestive heart failure, seems to be stable.  She is not getting

    IV fluids.  We will continue to monitor.

7. Essential hypertension.

8. Moderate depression.

9. Cognitive dysfunction.

10.Inflammatory arthritis.

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

 

PLAN:  At this point, the patient will continue on acute care.  She is on an

increased dose of prednisone 10 mg daily.  Blood pressures have been stable.  We

will continue the IV Rocephin and oral Zithromax.  We will encourage better oral

intake before switching over to oral antibiotics.  We will continue to wean

oxygen.  She has no history of chronic lung disease.  At this point, she is

getting scheduled nebulizers.  We will continue with the same, but change over

to p.r.n. once she is breathing better.  I anticipate that she will be stable

for discharge in another day or two.  We will have to work with the nursing home

regarding transfer back there.  Her daughter was also updated, but she is still

receiving acute treatments and is not stable for discharge today.  For DVT

prophylaxis, she is therapeutic on her INR.

 

 

MKA:  01/05/2018 10:45:18  MODL:  01/05/2018 11:18:42

Job #:  [id number]

## 2018-01-06 LAB
CHLORIDE SERPL-SCNC: 103 MMOL/L (ref 98–107)
SODIUM SERPL-SCNC: 143 MMOL/L (ref 136–145)

## 2018-01-06 RX ADMIN — OMEPRAZOLE SCH: 20 CAPSULE, DELAYED RELEASE ORAL at 07:45

## 2018-01-06 RX ADMIN — Medication SCH TAB: at 07:57

## 2018-01-06 RX ADMIN — OMEPRAZOLE SCH MG: 20 CAPSULE, DELAYED RELEASE ORAL at 05:53

## 2018-01-06 RX ADMIN — OMEGA-3 FATTY ACIDS CAP 1000 MG SCH GM: 1000 CAP at 07:57

## 2018-01-06 NOTE — PCM.PN
- General Info


Date of Service: 01/06/18


Subjective Update: 





Patient states she feels about the same today as yesterday. She continues to 

cough and have some shortness of breath. She denies any fever or chills. She is 

eating well and denies any vomiting or diarrhea.





- Review of Systems


General: Reports: No Symptoms


HEENT: Reports: No Symptoms


Pulmonary: Reports: Shortness of Breath, Cough


Cardiovascular: Reports: No Symptoms


Gastrointestinal: Reports: No Symptoms


Genitourinary: Reports: No Symptoms


Musculoskeletal: Reports: No Symptoms


Skin: Reports: No Symptoms





- Patient Data


Vitals - Most Recent: 


 Last Vital Signs











Temp  36.7 C   01/06/18 10:00


 


Pulse  68   01/06/18 10:00


 


Resp  20   01/06/18 10:00


 


BP  111/58 L  01/06/18 10:00


 


Pulse Ox  98   01/06/18 10:00











Weight - Most Recent: 76.839 kg


I&O - Last 24 Hours: 


 Intake & Output











 01/05/18 01/06/18 01/06/18





 22:59 06:59 14:59


 


Intake Total 360 120 120


 


Balance 360 120 120











Lab Results Last 24 Hours: 


 Laboratory Results - last 24 hr











  01/06/18 01/06/18 01/06/18 Range/Units





  07:21 07:21 07:21 


 


WBC  9.4    (4.0-10.0)  x10^3/uL


 


RBC  3.42 L    (4.00-5.50)  x10^6/uL


 


Hgb  9.6 L    (12.0-16.0)  g/dL


 


Hct  31.1 L    (33.0-47.0)  %


 


MCV  90.9    (78.0-93.0)  fL


 


MCH  28.1    (26.0-32.0)  pg


 


MCHC  30.9 L    (32.0-36.0)  g/dL


 


RDW Coeff of Ashley  17.5 H    (10.0-15.0)  %


 


Plt Count  189    (130-400)  x10^3/uL


 


Neut % (Auto)  81.6 H    (50.0-80.0)  %


 


Lymph % (Auto)  8.1 L    (25.0-50.0)  %


 


Mono % (Auto)  6.2    (2.0-11.0)  %


 


Eos % (Auto)  3.9    (0.0-4.0)  %


 


Baso % (Auto)  0.2    (0.2-1.2)  %


 


PT    61.4 H  (9.8-11.8)  SEC


 


INR    6.0 H*  (2.0-3.5)  


 


Sodium   143   (136-145)  mmol/L


 


Potassium   3.2 L   (3.5-5.1)  mmol/L


 


Chloride   103   ()  mmol/L


 


Carbon Dioxide   33 H   (21-32)  mmol/L


 


BUN   14   (7-18)  mg/dL


 


Creatinine   0.7   (0.55-1.02)  mg/dL


 


Est Cr Clr Drug Dosing   45.07   mL/min


 


Estimated GFR (MDRD)   > 60   


 


Glucose   103   ()  mg/dL


 


Calcium   8.4 L   (8.5-10.1)  mg/dL


 


Magnesium     (1.8-2.4)  mg/dL














  01/06/18 Range/Units





  07:21 


 


WBC   (4.0-10.0)  x10^3/uL


 


RBC   (4.00-5.50)  x10^6/uL


 


Hgb   (12.0-16.0)  g/dL


 


Hct   (33.0-47.0)  %


 


MCV   (78.0-93.0)  fL


 


MCH   (26.0-32.0)  pg


 


MCHC   (32.0-36.0)  g/dL


 


RDW Coeff of Ashley   (10.0-15.0)  %


 


Plt Count   (130-400)  x10^3/uL


 


Neut % (Auto)   (50.0-80.0)  %


 


Lymph % (Auto)   (25.0-50.0)  %


 


Mono % (Auto)   (2.0-11.0)  %


 


Eos % (Auto)   (0.0-4.0)  %


 


Baso % (Auto)   (0.2-1.2)  %


 


PT   (9.8-11.8)  SEC


 


INR   (2.0-3.5)  


 


Sodium   (136-145)  mmol/L


 


Potassium   (3.5-5.1)  mmol/L


 


Chloride   ()  mmol/L


 


Carbon Dioxide   (21-32)  mmol/L


 


BUN   (7-18)  mg/dL


 


Creatinine   (0.55-1.02)  mg/dL


 


Est Cr Clr Drug Dosing   mL/min


 


Estimated GFR (MDRD)   


 


Glucose   ()  mg/dL


 


Calcium   (8.5-10.1)  mg/dL


 


Magnesium  1.8  (1.8-2.4)  mg/dL











Regino Results Last 24 Hours: 


 Microbiology











 01/04/18 13:54 MRSA Surveillance Culture - Final





 Nares, Left    NO MRSA ISOLATED











Med Orders - Current: 


 Current Medications





Acetaminophen (Tylenol)  650 mg PO Q4H PRN


   PRN Reason: Pain (Mild 1-3)/fever


Acetaminophen (Tylenol Extra Strength)  500 mg PO Q4H PRN


   PRN Reason: Pain


Albuterol (Proventil Neb Soln)  2.5 mg NEB Q6HRRT PRN


   PRN Reason: Shortness of Breath


Albuterol/Ipratropium (Duoneb 3.0-0.5 Mg/3 Ml)  3 ml NEB Q4H PRN


   PRN Reason: dyspnea/wheezing


   Last Admin: 01/05/18 09:51 Dose:  3 ml


Artificial Tears (Tears Naturale Free)  1 each EYEBOTH Q4H PRN


   PRN Reason: Dry Eyes


Ascorbic Acid (Vitamin C)  500 mg PO BID [facility]


   Last Admin: 01/06/18 07:57 Dose:  500 mg


Aspirin (Halfprin)  81 mg PO DAILY [facility]


   Last Admin: 01/06/18 07:57 Dose:  81 mg


Atorvastatin Calcium (Lipitor)  40 mg PO BEDTIME [facility]


   Last Admin: 01/05/18 20:40 Dose:  40 mg


Bisacodyl (Dulcolax)  10 mg RECTAL ASDIRECTED PRN


   PRN Reason: Constipation


Brimonidine Tartrate (Alphagan 0.2% Ophth Soln)  0 ml EYEBOTH BID [facility]


   Last Admin: 01/06/18 07:57 Dose:  1 drop


Carvedilol (Coreg)  6.25 mg PO BID [facility]


   Last Admin: 01/06/18 07:58 Dose:  6.25 mg


Ceftriaxone Sodium (Rocephin)  1 gm IVPUSH Q24H [facility]


   Last Admin: 01/05/18 15:54 Dose:  1 gm


Fish Oil (Fish Oil)  1 gm PO DAILY [facility]


   Last Admin: 01/06/18 07:57 Dose:  1 gm


Furosemide (Lasix)  40 mg PO BIDDIURETIC [facility]


   Last Admin: 01/06/18 07:57 Dose:  40 mg


Hydromorphone HCl (Dilaudid)  0.25 mg IVPUSH Q2H PRN


   PRN Reason: Pain (severe 7-10)


Azithromycin 500 mg/ Sodium (Chloride)  250 mls @ 250 mls/hr IV Q24H [facility]


   Last Admin: 01/05/18 15:57 Dose:  250 mls/hr


Melatonin (Melatonin)  3 mg PO BEDTIME [facility]


   Last Admin: 01/05/18 20:40 Dose:  3 mg


Mirtazapine (Remeron)  15 mg PO BEDTIME [facility]


   Last Admin: 01/05/18 20:39 Dose:  15 mg


Multivitamins/Minerals (Prosight)  1 tab PO DAILY [facility]


   Last Admin: 01/06/18 07:57 Dose:  1 tab


Fluocinonide [Lidex 0.05% Solution] Own Supply  0 gm TOP BID PRN


   PRN Reason: scalp itching/rash


Ketoconazole [ Nizoral 2% Shampoo] Own Supply  1 applic TOP ASDIRECTED [facility]


Omeprazole (Omeprazole)  20 mg PO DAILY@0700 [facility]


   Last Admin: 01/06/18 07:45 Dose:  Not Given


Ondansetron HCl (Zofran)  4 mg IV Q6H PRN


   PRN Reason: Nausea/Vomiting


Ondansetron HCl (Zofran Odt)  4 mg PO Q6H PRN


   PRN Reason: Nausea


Oseltamivir Phosphate (Tamiflu)  75 mg PO DAILY [facility]


   Stop: 01/09/18 23:00


   Last Admin: 01/06/18 07:58 Dose:  75 mg


Phytonadione (Vitamin K)  100 mcg PO DAILY [facility]


   Last Admin: 01/06/18 07:57 Dose:  100 mcg


Potassium Chloride (Klor-Con)  20 meq PO BIDMEALS [facility]


   Last Admin: 01/06/18 07:58 Dose:  20 meq


Prednisone (Prednisone)  10 mg PO DAILY [facility]


   Last Admin: 01/06/18 07:57 Dose:  10 mg


Senna/Docusate Sodium (Senna Plus)  1 tab PO BID [facility]


   Last Admin: 01/06/18 07:57 Dose:  1 tab


Sodium Chloride (Saline Flush)  10 ml FLUSH ASDIRECTED PRN


   PRN Reason: Keep Vein Open


Tramadol HCl (Ultram)  50 mg PO BID [facility]


   Last Admin: 01/06/18 07:57 Dose:  50 mg


Venlafaxine HCl (Effexor)  37.5 mg PO BID [facility]


   Last Admin: 01/06/18 07:57 Dose:  37.5 mg





Discontinued Medications





Bisacodyl (Dulcolax)  10 mg RECTAL Q3D PRN


   PRN Reason: Constipation


Enoxaparin Sodium (Lovenox)  40 mg SUBCUT DAILY [facility]


Potassium Chloride (Klor-Con)  20 meq PO DAILY [facility]


   Last Admin: 01/05/18 09:42 Dose:  Not Given


Tramadol HCl (Ultram)  50 mg PO TID [facility]


   Last Admin: 01/05/18 09:31 Dose:  50 mg


Venlafaxine HCl (Effexor)  75 mg PO BID [facility]


   Last Admin: 01/05/18 09:31 Dose:  75 mg


Warfarin Sodium (Coumadin)  5 mg PO SuTuThSa@2000 [facility]


   Last Admin: 01/04/18 19:49 Dose:  5 mg


Warfarin Sodium (Coumadin)  7.5 mg PO MoWeFr@2000 [facility]











- Exam


General: Alert, Cooperative, No Acute Distress


HEENT: Mucous Membr. Moist/Pink


Neck: Supple, Trachea Midline, No Thyromegaly.  No: Lymphadenopathy


Lungs: Clear to Auscultation, Normal Respiratory Effort


Cardiovascular: Regular Rate, Irregular Rhythm


GI/Abdominal Exam: Normal Bowel Sounds, Soft


Extremities: Non-Tender, No Pedal Edema, Normal Capillary Refill


Skin: Warm, Dry, Intact





- Problem List & Annotations


(1) Pneumonia


SNOMED Code(s): 843536518


   Code(s): J18.9 - PNEUMONIA, UNSPECIFIED ORGANISM   Status: Acute   Current 

Visit: Yes   


Qualifiers: 


   Pneumonia type: due to unspecified organism   Laterality: unspecified 

laterality   Lung location: unspecified part of lung   Qualified Code(s): J18.9 

- Pneumonia, unspecified organism   


Annotation/Comment:: - Patient diagnosed with pneumonia based on symptoms and 

lab results.


- Not significantly improved clinically but certainly not worsening. Labs 

improving.


- At this point, would continue the same. If she is still not feeling much 

improved in the am, then we will consider adjusting antibiotic regimen.


- Otherwise, continue ceftriaxone and azithromycin.


- Continue higher prednisone dose and neb treatments.   





(2) Influenza


SNOMED Code(s): 9548858


   Code(s): J11.1 - FLU DUE TO UNIDENTIFIED INFLUENZA VIRUS W OTH RESP MANIFEST

   Status: Acute   Current Visit: Yes   Annotation/Comment:: - Swab negative 

but the clinical history was quite convincing for influenza.


- Therefore, she is receiving treatment with tamiflu.   





(3) Acute respiratory failure with hypoxia


SNOMED Code(s): 22196414


   Code(s): J96.01 - ACUTE RESPIRATORY FAILURE WITH HYPOXIA   Status: Acute   

Current Visit: Yes   Annotation/Comment:: - Oxygen requirements are stable. 

Will continue to wean as able.   





(4) Supratherapeutic INR


SNOMED Code(s): 018856796


   Code(s): R79.1 - ABNORMAL COAGULATION PROFILE   Status: Acute   Current Visit

: Yes   Annotation/Comment:: - INR up to 6 today.


- Warfarin will be held. No indication for vitamin K at this time.


- Monitor daily.   





(5) Atrial fibrillation


SNOMED Code(s): 80572004


   Code(s): I48.91 - UNSPECIFIED ATRIAL FIBRILLATION   Status: Chronic   

Current Visit: No   Annotation/Comment:: - Rates controlled. Supratherapeutic 

INR as above.


- Continue home medications.   





(6) CHF (congestive heart failure)


SNOMED Code(s): 70564375


   Code(s): I50.9 - HEART FAILURE, UNSPECIFIED   Status: Chronic   Priority: 

Medium   Current Visit: No   


Qualifiers: 


   Congestive heart failure type: unspecified congestive heart failure type   

Congestive heart failure chronicity: acute on chronic   Qualified Code(s): 

I50.9 - Heart failure, unspecified   


Annotation/Comment:: - No evidence of CHF exacerbation.


- Will continue to monitor.


- Home medications continued.   





- Problem List Review


Problem List Initiated/Reviewed/Updated: Yes





- Assessment


Assessment:: 





88 yo female admitted with hypoxic respiratory failure secondary to pneumonia 

and presumed influenza. She feels about the same but her labs are improving.





- Plan


Plan:: 


She will remain on ceftriaxone, azithromycin, and tamiflu. Can consider 

transition to PO antibiotics tomorrow if she is doing better. Continue to wean 

oxygen as able. She will likely remain on acute cares for another 48 hours. She 

does not require VTE prophylaxis as her INR remains supratherapeutic. She is 

DNR.

## 2018-01-07 LAB
CHLORIDE SERPL-SCNC: 105 MMOL/L (ref 98–107)
SODIUM SERPL-SCNC: 144 MMOL/L (ref 136–145)

## 2018-01-07 RX ADMIN — Medication SCH TAB: at 07:18

## 2018-01-07 RX ADMIN — OMEPRAZOLE SCH MG: 20 CAPSULE, DELAYED RELEASE ORAL at 06:22

## 2018-01-07 RX ADMIN — OMEGA-3 FATTY ACIDS CAP 1000 MG SCH GM: 1000 CAP at 07:18

## 2018-01-07 NOTE — PCM.PN
- General Info


Date of Service: 01/07/18


Subjective Update: 





Patient states she is feeling much better today. Shortness of breath and cough 

are improved. No fever, chills, or chest pain. Is happy she is feeling better.





- Review of Systems


General: Reports: No Symptoms


HEENT: Reports: No Symptoms


Pulmonary: Reports: No Symptoms


Cardiovascular: Reports: No Symptoms


Gastrointestinal: Reports: No Symptoms


Genitourinary: Reports: No Symptoms


Musculoskeletal: Reports: No Symptoms


Skin: Reports: No Symptoms





- Patient Data


Vitals - Most Recent: 


 Last Vital Signs











Temp  36.4 C   01/07/18 05:33


 


Pulse  67   01/07/18 05:33


 


Resp  18   01/07/18 05:33


 


BP  141/72 H  01/07/18 05:33


 


Pulse Ox  93 L  01/07/18 07:04











Weight - Most Recent: 76.839 kg


I&O - Last 24 Hours: 


 Intake & Output











 01/06/18 01/07/18 01/07/18





 22:59 06:59 14:59


 


Intake Total 730 120 10


 


Balance 730 120 10











Lab Results Last 24 Hours: 


 Laboratory Results - last 24 hr











  01/07/18 01/07/18 01/07/18 Range/Units





  07:20 07:20 07:20 


 


WBC  7.8    (4.0-10.0)  x10^3/uL


 


RBC  3.43 L    (4.00-5.50)  x10^6/uL


 


Hgb  9.6 L    (12.0-16.0)  g/dL


 


Hct  31.2 L    (33.0-47.0)  %


 


MCV  91.0    (78.0-93.0)  fL


 


MCH  28.0    (26.0-32.0)  pg


 


MCHC  30.8 L    (32.0-36.0)  g/dL


 


RDW Coeff of Ashley  17.4 H    (10.0-15.0)  %


 


Plt Count  176    (130-400)  x10^3/uL


 


Neut % (Auto)  76.8    (50.0-80.0)  %


 


Lymph % (Auto)  11.7 L    (25.0-50.0)  %


 


Mono % (Auto)  6.2    (2.0-11.0)  %


 


Eos % (Auto)  5.0 H    (0.0-4.0)  %


 


Baso % (Auto)  0.3    (0.2-1.2)  %


 


PT   47.3 H   (9.8-11.8)  SEC


 


INR   4.6 H   (2.0-3.5)  


 


Sodium    144  (136-145)  mmol/L


 


Potassium    3.3 L  (3.5-5.1)  mmol/L


 


Chloride    105  ()  mmol/L


 


Carbon Dioxide    32  (21-32)  mmol/L


 


BUN    13  (7-18)  mg/dL


 


Creatinine    0.6  (0.55-1.02)  mg/dL


 


Est Cr Clr Drug Dosing    52.58  mL/min


 


Estimated GFR (MDRD)    > 60  


 


Glucose    102  ()  mg/dL


 


Calcium    8.3 L  (8.5-10.1)  mg/dL


 


C-Reactive Protein    12.9 H  (<=0.9)  mg/dL











Med Orders - Current: 


 Current Medications





Acetaminophen (Tylenol)  650 mg PO Q4H PRN


   PRN Reason: Pain (Mild 1-3)/fever


Acetaminophen (Tylenol Extra Strength)  500 mg PO Q4H PRN


   PRN Reason: Pain


   Last Admin: 01/06/18 16:48 Dose:  500 mg


Albuterol (Proventil Neb Soln)  2.5 mg NEB Q6HRRT PRN


   PRN Reason: Shortness of Breath


Albuterol/Ipratropium (Duoneb 3.0-0.5 Mg/3 Ml)  3 ml NEB Q4H PRN


   PRN Reason: dyspnea/wheezing


   Last Admin: 01/05/18 09:51 Dose:  3 ml


Artificial Tears (Tears Naturale Free)  1 each EYEBOTH Q4H PRN


   PRN Reason: Dry Eyes


Ascorbic Acid (Vitamin C)  500 mg PO BID [facility]


   Last Admin: 01/07/18 07:17 Dose:  500 mg


Aspirin (Halfprin)  81 mg PO DAILY [facility]


   Last Admin: 01/07/18 07:18 Dose:  81 mg


Atorvastatin Calcium (Lipitor)  40 mg PO BEDTIME [facility]


   Last Admin: 01/06/18 20:04 Dose:  40 mg


Bisacodyl (Dulcolax)  10 mg RECTAL ASDIRECTED PRN


   PRN Reason: Constipation


Brimonidine Tartrate (Alphagan 0.2% Ophth Soln)  0 ml EYEBOTH BID [facility]


   Last Admin: 01/07/18 07:17 Dose:  1 drop


Carvedilol (Coreg)  6.25 mg PO BID [facility]


   Last Admin: 01/07/18 07:18 Dose:  6.25 mg


Fish Oil (Fish Oil)  1 gm PO DAILY [facility]


   Last Admin: 01/07/18 07:18 Dose:  1 gm


Furosemide (Lasix)  40 mg PO BIDDIURETIC [facility]


   Last Admin: 01/07/18 07:18 Dose:  40 mg


Hydromorphone HCl (Dilaudid)  0.25 mg IVPUSH Q2H PRN


   PRN Reason: Pain (severe 7-10)


   Last Admin: 01/06/18 21:31 Dose:  0.25 mg


Melatonin (Melatonin)  3 mg PO BEDTIME [facility]


   Last Admin: 01/06/18 20:04 Dose:  3 mg


Mirtazapine (Remeron)  15 mg PO BEDTIME [facility]


   Last Admin: 01/06/18 20:04 Dose:  15 mg


Multivitamins/Minerals (Prosight)  1 tab PO DAILY [facility]


   Last Admin: 01/07/18 07:18 Dose:  1 tab


Fluocinonide [Lidex 0.05% Solution] Own Supply  0 gm TOP BID PRN


   PRN Reason: scalp itching/rash


Ketoconazole [ Nizoral 2% Shampoo] Own Supply  1 applic TOP ASDIRECTED [facility]


Omeprazole (Omeprazole)  20 mg PO DAILY@0700 [facility]


   Last Admin: 01/07/18 06:22 Dose:  20 mg


Ondansetron HCl (Zofran)  4 mg IV Q6H PRN


   PRN Reason: Nausea/Vomiting


Ondansetron HCl (Zofran Odt)  4 mg PO Q6H PRN


   PRN Reason: Nausea


Oseltamivir Phosphate (Tamiflu)  75 mg PO DAILY [facility]


   Stop: 01/09/18 23:00


   Last Admin: 01/07/18 07:18 Dose:  75 mg


Phytonadione (Vitamin K)  100 mcg PO DAILY [facility]


   Last Admin: 01/07/18 07:18 Dose:  100 mcg


Potassium Chloride (Klor-Con)  20 meq PO BIDMEALS [facility]


   Last Admin: 01/07/18 07:17 Dose:  20 meq


Prednisone (Prednisone)  10 mg PO DAILY [facility]


   Last Admin: 01/07/18 07:18 Dose:  10 mg


Senna/Docusate Sodium (Senna Plus)  1 tab PO BID [facility]


   Last Admin: 01/07/18 07:18 Dose:  1 tab


Sodium Chloride (Saline Flush)  10 ml FLUSH ASDIRECTED PRN


   PRN Reason: Keep Vein Open


Tramadol HCl (Ultram)  50 mg PO BID [facility]


   Last Admin: 01/07/18 07:18 Dose:  50 mg


Venlafaxine HCl (Effexor)  37.5 mg PO BID [facility]


   Last Admin: 01/07/18 07:18 Dose:  37.5 mg





Discontinued Medications





Bisacodyl (Dulcolax)  10 mg RECTAL Q3D PRN


   PRN Reason: Constipation


Ceftriaxone Sodium (Rocephin)  1 gm IVPUSH Q24H [facility]


   Last Admin: 01/06/18 15:55 Dose:  1 gm


Enoxaparin Sodium (Lovenox)  40 mg SUBCUT DAILY [facility]


Azithromycin 500 mg/ Sodium (Chloride)  250 mls @ 250 mls/hr IV Q24H [facility]


   Last Admin: 01/06/18 15:55 Dose:  250 mls/hr


Potassium Chloride (Klor-Con)  20 meq PO DAILY [facility]


   Last Admin: 01/05/18 09:42 Dose:  Not Given


Tramadol HCl (Ultram)  50 mg PO TID [facility]


   Last Admin: 01/05/18 09:31 Dose:  50 mg


Venlafaxine HCl (Effexor)  75 mg PO BID [facility]


   Last Admin: 01/05/18 09:31 Dose:  75 mg


Warfarin Sodium (Coumadin)  5 mg PO SuTuThSa@2000 [facility]


   Last Admin: 01/04/18 19:49 Dose:  5 mg


Warfarin Sodium (Coumadin)  7.5 mg PO MoWeFr@2000 [facility]











- Exam


General: Alert, Cooperative, No Acute Distress


HEENT: Mucous Membr. Moist/Pink


Neck: Supple, No Thyromegaly.  No: Lymphadenopathy


Lungs: Clear to Auscultation, Normal Respiratory Effort


Cardiovascular: Regular Rate, Regular Rhythm, No Murmurs


GI/Abdominal Exam: Normal Bowel Sounds, Soft, Non-Tender, No Organomegaly, No 

Distention, No Mass


Extremities: Normal Inspection, Non-Tender, No Pedal Edema, Normal Capillary 

Refill


Peripheral Pulses: 2+: Radial (L), Radial (R), Posterior Tibial (L), Posterior 

Tibial (R)


Skin: Warm, Dry, Intact





- Problem List & Annotations


(1) Pneumonia


SNOMED Code(s): 115539276


   Code(s): J18.9 - PNEUMONIA, UNSPECIFIED ORGANISM   Status: Acute   Current 

Visit: Yes   


Qualifiers: 


   Pneumonia type: due to unspecified organism   Laterality: unspecified 

laterality   Lung location: unspecified part of lung   Qualified Code(s): J18.9 

- Pneumonia, unspecified organism   


Annotation/Comment:: - Patient diagnosed with pneumonia based on symptoms and 

lab results.


- She is improved today symptomatically; labs continue to improve.


- Will transition to PO antibiotics today of cefdinir and azithromycin.


- Continue higher prednisone dose and neb treatments.   





(2) Influenza


SNOMED Code(s): 0968429


   Code(s): J11.1 - FLU DUE TO UNIDENTIFIED INFLUENZA VIRUS W OTH RESP MANIFEST

   Status: Acute   Current Visit: Yes   Annotation/Comment:: - Swab negative 

but the clinical history was quite convincing for influenza.


- She is on prophylaxis for influenza given exposure. Given improvement and 

negative influenza swab, she will not be on treatment doses.   





(3) Acute respiratory failure with hypoxia


SNOMED Code(s): 12430909


   Code(s): J96.01 - ACUTE RESPIRATORY FAILURE WITH HYPOXIA   Status: Acute   

Current Visit: Yes   Annotation/Comment:: - Oxygen requirements are stable. 

Will continue to wean as able.   





(4) Supratherapeutic INR


SNOMED Code(s): 216905116


   Code(s): R79.1 - ABNORMAL COAGULATION PROFILE   Status: Acute   Current Visit

: Yes   Annotation/Comment:: - INR down to 4.6 today. 


- Warfarin will continue to be held.


- If INR is down again tomorrow morning, will restart warfarin.   





(5) Atrial fibrillation


SNOMED Code(s): 39606870


   Code(s): I48.91 - UNSPECIFIED ATRIAL FIBRILLATION   Status: Chronic   

Current Visit: No   Annotation/Comment:: - Rates controlled. Supratherapeutic 

INR as above.


- Continue home medications.   





(6) CHF (congestive heart failure)


SNOMED Code(s): 23138852


   Code(s): I50.9 - HEART FAILURE, UNSPECIFIED   Status: Chronic   Priority: 

Medium   Current Visit: No   


Qualifiers: 


   Congestive heart failure type: unspecified congestive heart failure type   

Congestive heart failure chronicity: acute on chronic   Qualified Code(s): 

I50.9 - Heart failure, unspecified   


Annotation/Comment:: - No evidence of CHF exacerbation.


- Will continue to monitor.


- Home medications continued.   





- Problem List Review


Problem List Initiated/Reviewed/Updated: Yes





- Assessment


Assessment:: 





88 yo female admitted with hypoxic respiratory failure secondary to pneumonia. 

Much improved this am.





- Plan


Plan:: 


See details under problems above. Will transition to PO antibiotics today. 

Continue to wean oxygen as able. Anticipate she will be stable for discharge 

back to the nursing home tomorrow. She does not require VTE prophylaxis as her 

INR remains supratherapeutic. She is DNR.

## 2018-01-08 VITALS — SYSTOLIC BLOOD PRESSURE: 142 MMHG | DIASTOLIC BLOOD PRESSURE: 59 MMHG

## 2018-01-08 LAB
CHLORIDE SERPL-SCNC: 104 MMOL/L (ref 98–107)
SODIUM SERPL-SCNC: 144 MMOL/L (ref 136–145)

## 2018-01-08 RX ADMIN — Medication SCH TAB: at 07:21

## 2018-01-08 RX ADMIN — OMEPRAZOLE SCH MG: 20 CAPSULE, DELAYED RELEASE ORAL at 06:20

## 2018-01-08 RX ADMIN — OMEGA-3 FATTY ACIDS CAP 1000 MG SCH GM: 1000 CAP at 07:21

## 2018-01-09 NOTE — DISCH
PRIMARY DISCHARGE DIAGNOSES:

1. Acute hypoxic respiratory failure secondary to pneumonia and likely

    influenza.

2. Pneumonia due to unspecified organism, unspecified parts of the lung.

3. Significant influenza exposure but negative influenza testing.  She is

    being treated with Tamiflu.

4. Supratherapeutic INR.  INR improved to 3 with holding Coumadin.  No

    bleeding problems.

5. History of stroke with underlying atrial fibrillation.

6. Atrial fibrillation, rate controlled.

7. Chronic diastolic heart failure, stable without exacerbation.

8. Essential hypertension, moderate depression, cognitive dysfunction,

    inflammatory arthritis, history of right middle cerebral artery stroke.

 

REASON FOR ADMISSION:  On the date of admission, this 89-year-old female was

having fever, cough, and shortness of breath.  She was transferred over to the

clinic and was admitted for acute cares.  She remained afebrile during her acute

hospital stay.  She was started on IV Rocephin and oral Zithromax and was

tolerating that.  She does carry an allergy to cefuroxime, but tolerated the

Rocephin okay and did complete all 5 doses while here.  She has 1 dose this

afternoon and Zithromax left.  She was also increased from 5 to 10 mg daily of

prednisone given her chronic steroid dependence.  She received nebulizers but

was not wheezing and overall she felt her breathing was better and her cough

were better on discharge.  The only concern she has is that she just felt

exhausted.  She did not remember if she necessarily slept poorly.  She did not

think she was having any bowel movements during her stay, and I do not see none

recorded.  She was eating up to 90% of some meals, but mostly she was eating

between 10% and 50%.  She was denying any pain currently, but at times she was

having pain in the left arm and left leg, and in fact, the left knee was wrapped

and she was not sure why she had no pain when I examined her.

 

PHYSICAL EXAMINATION:

VITAL SIGNS:  Discharge vitals included temperature 98, pulse 88, blood pressure

142/59, respiratory rate 18, O2 of 96 on 1.5 L.

GENERAL:  She is in no acute distress.

HEART:  Irregularly irregular.

LUNGS:  Lungs sounds were clear to auscultation bilaterally without crackles or

wheezes.

ABDOMEN:  Positive bowel sounds.  Soft and nontender.

EXTREMITIES:  Slightly edematous, but warm and dry.

MENTAL STATUS:  She is alert.  She seems to recognize me.

 

DISCHARGE PLANS AND INSTRUCTIONS:  She will be seen on nursing home rounds in

February.  She will take Zithromax this afternoon and her last dose of Tamiflu

tomorrow morning.  She will be on prednisone 10 mg daily for another 5 days,

then resume 5 mg daily.  INR check will be on 01/12 and she will resume her same

warfarin dosing as prior to admission with 7.5 mg on Monday, Wednesday, Friday

and 5 mg the rest of the week.  Aspirin was stopped due to her being on

Coumadin.  Potassium was increased to twice daily.  She should have a followup

BMP in 1 week's time.  Hemoglobin was stable at 10.6 on discharge.  White count

was normalized at 8.1, it was elevated on admission.  Potassium 3.6 on

discharge.  Creatinine 0.7.

 

Connor Wallace will be transitioning back to [facility].

 

Greater than 30 minutes spent on the discharge process.

 

 

MKA:  01/08/2018 08:48:01  MODL:  01/09/2018 05:04:25

Job #:  [id number]

## 2018-01-24 ENCOUNTER — HOSPITAL ENCOUNTER (EMERGENCY)
Dept: HOSPITAL 50 - VM.ED | Age: 83
Discharge: HOME | End: 2018-01-24
Payer: MEDICARE

## 2018-01-24 VITALS — SYSTOLIC BLOOD PRESSURE: 135 MMHG | DIASTOLIC BLOOD PRESSURE: 75 MMHG

## 2018-01-24 DIAGNOSIS — Z88.5: ICD-10-CM

## 2018-01-24 DIAGNOSIS — Z91.013: ICD-10-CM

## 2018-01-24 DIAGNOSIS — K59.00: Primary | ICD-10-CM

## 2018-01-24 DIAGNOSIS — I50.9: ICD-10-CM

## 2018-01-24 DIAGNOSIS — Z79.899: ICD-10-CM

## 2018-01-24 DIAGNOSIS — Z87.891: ICD-10-CM

## 2018-01-24 DIAGNOSIS — I11.0: ICD-10-CM

## 2018-01-24 DIAGNOSIS — E78.00: ICD-10-CM

## 2018-01-24 DIAGNOSIS — Z88.8: ICD-10-CM

## 2018-01-24 LAB
CHLORIDE SERPL-SCNC: 104 MMOL/L (ref 98–107)
SODIUM SERPL-SCNC: 142 MMOL/L (ref 136–145)

## 2018-01-24 NOTE — EDM.PDOC
ED HPI GENERAL MEDICAL PROBLEM





- General


Chief Complaint: General


Stated Complaint: abdominal pain?


Time Seen by Provider: 01/24/18 21:05


Source of Information: Reports: Patient


History Limitations: Reports: No Limitations





- History of Present Illness


INITIAL COMMENTS - FREE TEXT/NARRATIVE: 





Pt. is a resident at [facility]. Pt. complained of severe abdominal pain shortly 

before coming to the ER. Staff told EMS that she was "screaming in pain". On 

arrival of EMS, she was no longer screaming in pain, but was resistive to care 

and assessment by EMTs. Staff states that she has not recently fallen. Daughter 

states that pt has severe dementia and states that she has been complaining of 

"leg pain" since being discharged from the hospital recently, but did not 

complain of this to nursing staff or EMTs tonight.


Onset: Today


Onset Date: 01/24/18


Onset Time: 20:45


Duration: Resolved Prior to Arrival


Location: Reports: Abdomen (resolved on arrival)


Quality: Reports: Other (unknown)





- Related Data


 Allergies











Allergy/AdvReac Type Severity Reaction Status Date / Time


 


cefuroxime axetil Allergy  Cannot Verified 01/24/18 21:55





[From Ceftin]   Remember  


 


ciprofloxacin [From Cipro] Allergy  Cannot Verified 01/24/18 21:55





   Remember  


 


codeine Allergy  Cannot Verified 01/24/18 21:55





   Remember  


 


shellfish derived Allergy  Cannot Verified 01/24/18 21:55





   Remember  


 


hayfever Allergy  Cannot Uncoded 01/24/18 21:55





   Remember  











Home Meds: 


 Home Meds





Albuterol [Proair HFA] 1 puff IH Q4H PRN 05/05/15 [History]


Carvedilol [Coreg] 6.25 mg PO BIDMEALS 05/05/15 [History]


Ketoconazole [Nizoral 2% Shampoo] 1 applic TOP ASDIRECTED 04/19/16 [History]


Melatonin 3 mg PO BEDTIME 04/19/16 [History]


atorvaSTATin [Lipitor] 40 mg PO BEDTIME 04/19/16 [History]


Venlafaxine [Effexor] 37.5 mg PO BID 04/20/16 [History]


Phytonadione [Vitamin K] 100 mcg PO DAILY 11/27/16 [History]


traMADol [Ultram] 50 mg PO BID 11/27/16 [History]


Brimonidine Tartrate [Brimonidine Tartrate 0.2% Ophth Soln] 1 drop EYEBOTH BID 

11/28/16 [History]


Mirtazapine [Remeron] 15 mg PO BEDTIME 11/28/16 [History]


Ondansetron [Zofran ODT] 4 mg PO Q6H PRN 11/28/16 [History]


Warfarin [Coumadin] 5 mg PO DAILY 11/28/16 [History]


Acetaminophen [Tylenol Extra Strength] 500 mg PO Q4H PRN 01/04/18 [History]


Bisacodyl 10 mg RECTAL ASDIRECTED PRN 01/04/18 [History]


Fluocinonide 1 applic TOP BID PRN 01/04/18 [History]


Furosemide 40 mg PO BID 01/04/18 [History]


Omeprazole 20 mg PO DAILY 01/04/18 [History]


Potassium Chloride [Klor-Con] 20 meq PO BIDMEALS #60 packet 01/08/18 [Rx]


predniSONE [Prednisone] 5 mg PO DAILY #30 01/08/18 [Rx]


Bisacodyl [Dulcolax] 5 mg PO ASDIRECTED PRN 01/24/18 [History]


Dextran 70/Hypromellose [Artificial Tears] 1 each OP Q4H PRN 01/24/18 [History]


Multivitamin [Daily Multiple Vitamin] 1 tab PO DAILY 01/24/18 [History]


Sennosides/Docusate Sodium [Senna Plus Tablet] 1 tab PO BID 01/24/18 [History]











Past Medical History


HEENT History: Reports: Cataract, Glaucoma


Cardiovascular History: Reports: Afib, Heart Failure, High Cholesterol, 

Hypertension


Gastrointestinal History: Reports: Colon Polyp, Diverticulosis, GERD


Genitourinary History: Reports: Urinary Incontinence


OB/GYN History: Reports: Pregnancy


Musculoskeletal History: Reports: Arthritis, Osteoarthritis, Osteoporosis


Neurological History: Reports: CVA, TIA


Psychiatric History: Reports: Dementia, Depression


Oncologic (Cancer) History: Reports: Breast


Dermatologic History: Reports: Psoriasis





- Past Surgical History


HEENT Surgical History: Reports: Adenoidectomy, Cataract Surgery, Tonsillectomy


Female  Surgical History: Reports: Breast Biopsy, Hysterectomy


Neurological Surgical History: Reports: Other (See Below)





Social & Family History





- Tobacco Use


Smoking Status *Q: Former Smoker


Used Tobacco, but Quit: Yes


Month Tobacco Last Used: 40 years ago





- Alcohol Use


Days Per Week of Alcohol Use: 0





- Recreational Drug Use


Recreational Drug Use: No





ED ROS GENERAL





- Review of Systems


Review Of Systems: Unable To Obtain


GI/Abdominal: Reports: Abdominal Pain





ED EXAM, GENERAL





- Physical Exam


Exam: See Below


Exam Limited By: No Limitations


General Appearance: Alert, No Apparent Distress


Throat/Mouth: Normal Inspection, Normal Lips, Normal Teeth, Normal Gums, Normal 

Oropharynx, Normal Voice, No Airway Compromise


Head: Atraumatic, Normocephalic


Neck: Normal Inspection, Supple, Non-Tender, Full Range of Motion


Respiratory/Chest: No Respiratory Distress, Lungs Clear, Normal Breath Sounds, 

No Accessory Muscle Use, Chest Non-Tender


Cardiovascular: Normal Peripheral Pulses, Regular Rate, Rhythm, No Edema, No 

Gallop, No JVD, No Murmur, No Rub


Peripheral Pulses: 2+: Radial (L), Radial (R)


GI/Abdominal: Normal Bowel Sounds, Soft, Non-Tender, No Organomegaly, No 

Distention, No Mass, Pelvis Stable.  No: Guarding, Rigid, Rebound, Tender, 

Abnormal Bowel Sounds, Mass, Hepatomegaly


 (Female) Exam: Deferred


Rectal (Female) Exam: Deferred


Back Exam: Normal Inspection, Full Range of Motion, NT


Extremities: Normal Inspection, Normal Range of Motion, Non-Tender, No Pedal 

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

ray. No obvious issues with increased discomfort on manipulation of the hip)


Neurological: Alert, Oriented, CN II-XII Intact, Normal Cognition, Normal Gait, 

Normal Reflexes, No Motor/Sensory Deficits


Psychiatric: Other (resistive to care, according to EMS)


Skin Exam: Warm, Dry, Intact, No Rash, Pallor


Lymphatic: No Adenopathy





Course





- Vital Signs


Last Recorded V/S: 


 Last Vital Signs











Temp  35.9 C   01/24/18 21:05


 


Pulse  88   01/24/18 21:05


 


Resp  18   01/24/18 21:05


 


BP  135/75   01/24/18 21:05


 


Pulse Ox  94 L  01/24/18 21:05














- Orders/Labs/Meds


Orders: 


 Active Orders 24 hr











 Category Date Time Status


 


 Abdomen 2V AP Flat Upright [CR] Stat Exams  01/24/18 21:14 Ordered











Labs: 


 Laboratory Tests











  01/24/18 01/24/18 Range/Units





  21:35 21:35 


 


WBC  6.4   (4.0-10.0)  x10^3/uL


 


RBC  3.82 L   (4.00-5.50)  x10^6/uL


 


Hgb  10.7 L   (12.0-16.0)  g/dL


 


Hct  35.2   (33.0-47.0)  %


 


MCV  92.1   (78.0-93.0)  fL


 


MCH  28.0   (26.0-32.0)  pg


 


MCHC  30.4 L   (32.0-36.0)  g/dL


 


RDW Coeff of Ashley  18.9 H   (10.0-15.0)  %


 


Plt Count  243   (130-400)  x10^3/uL


 


Neut % (Auto)  63.2   (50.0-80.0)  %


 


Lymph % (Auto)  17.6 L   (25.0-50.0)  %


 


Mono % (Auto)  11.2 H   (2.0-11.0)  %


 


Eos % (Auto)  7.5 H   (0.0-4.0)  %


 


Baso % (Auto)  0.5   (0.2-1.2)  %


 


Sodium   142  (136-145)  mmol/L


 


Potassium   4.2  (3.5-5.1)  mmol/L


 


Chloride   104  ()  mmol/L


 


Carbon Dioxide   31  (21-32)  mmol/L


 


BUN   19 H  (7-18)  mg/dL


 


Creatinine   0.9  (0.55-1.02)  mg/dL


 


Est Cr Clr Drug Dosing   TNP  


 


Estimated GFR (MDRD)   59  


 


Glucose   93  ()  mg/dL


 


Calcium   8.6  (8.5-10.1)  mg/dL


 


Corrected Calcium   9.88  (8.5-10.1)  mg/dL


 


Total Bilirubin   0.5  (0.2-1.0)  mg/dL


 


AST   22  (15-37)  U/L


 


ALT   21  (14-59)  U/L


 


Alkaline Phosphatase   90  ()  U/L


 


Total Protein   7.0  (6.4-8.2)  g/dL


 


Albumin   2.4 L  (3.4-5.0)  g/dL


 


Globulin   4.6  


 


Albumin/Globulin Ratio   0.52  


 


Lipase   113  ()  U/L














- Radiology Interpretation


Free Text/Narrative:: 





abdominal films show large stool throughout colon. Also noted to have old non-

union of L femoral neck.





Departure





- Departure


Time of Disposition: 22:57


Disposition: Home, Self-Care 01


Condition: Fair


Clinical Impression: 


 Constipation








- Discharge Information


Instructions:  Constipation, Adult


Referrals: 


Aisha Huerta DO [Primary Care Provider] - 


Forms:  ED Department Discharge


Additional Instructions: 


Start Miralax 17mg once daily. 





Increase fluids. 





Increase bisacodyl 5mg orally twice daily.





- My Orders


Last 24 Hours: 


My Active Orders





01/24/18 21:14


Abdomen 2V AP Flat Upright [CR] Stat 














- Assessment/Plan


Last 24 Hours: 


My Active Orders





01/24/18 21:14


Abdomen 2V AP Flat Upright [CR] Stat 











Assessment:: 


Constipation


non-union L hip


Plan: 





I did contact Dr. Lyn at [facility]. He felt that, since the pt. was not 

having discomfort and was not ambulatory, no intervention was needed for the 

hip.





Will increase bisacodyl to 5mg twice daily orally to help with colonic transit. 

Also advised starting the pt. on Miralax.